# Patient Record
Sex: FEMALE | Race: WHITE | NOT HISPANIC OR LATINO | Employment: PART TIME | URBAN - NONMETROPOLITAN AREA
[De-identification: names, ages, dates, MRNs, and addresses within clinical notes are randomized per-mention and may not be internally consistent; named-entity substitution may affect disease eponyms.]

---

## 2017-01-06 ENCOUNTER — TELEPHONE (OUTPATIENT)
Dept: OTOLARYNGOLOGY | Facility: CLINIC | Age: 57
End: 2017-01-06

## 2017-01-06 NOTE — TELEPHONE ENCOUNTER
----- Message from Lui Miller MD sent at 1/6/2017  8:31 AM CST -----  Please call the patient regarding her normal results, no mass, minimal sinus inflammation

## 2017-03-31 ENCOUNTER — OFFICE VISIT (OUTPATIENT)
Dept: OTOLARYNGOLOGY | Facility: CLINIC | Age: 57
End: 2017-03-31

## 2017-03-31 VITALS
TEMPERATURE: 97.8 F | WEIGHT: 124 LBS | SYSTOLIC BLOOD PRESSURE: 114 MMHG | HEIGHT: 62 IN | HEART RATE: 66 BPM | RESPIRATION RATE: 20 BRPM | BODY MASS INDEX: 22.82 KG/M2 | DIASTOLIC BLOOD PRESSURE: 80 MMHG

## 2017-03-31 DIAGNOSIS — H90.3 SENSORINEURAL HEARING LOSS OF BOTH EARS: ICD-10-CM

## 2017-03-31 DIAGNOSIS — H93.8X3 EAR FULLNESS, BILATERAL: ICD-10-CM

## 2017-03-31 DIAGNOSIS — H81.03 MENIERE'S DISEASE OF BOTH EARS: Primary | ICD-10-CM

## 2017-03-31 DIAGNOSIS — R42 VERTIGO: ICD-10-CM

## 2017-03-31 DIAGNOSIS — J30.9 ALLERGIC RHINITIS, UNSPECIFIED ALLERGIC RHINITIS TRIGGER, UNSPECIFIED RHINITIS SEASONALITY: ICD-10-CM

## 2017-03-31 DIAGNOSIS — H93.13 TINNITUS, BILATERAL: ICD-10-CM

## 2017-03-31 PROCEDURE — 99214 OFFICE O/P EST MOD 30 MIN: CPT | Performed by: NURSE PRACTITIONER

## 2017-03-31 RX ORDER — FLUTICASONE PROPIONATE 50 MCG
2 SPRAY, SUSPENSION (ML) NASAL DAILY
Qty: 1 EACH | Refills: 6 | Status: SHIPPED | OUTPATIENT
Start: 2017-03-31 | End: 2017-04-30

## 2017-03-31 RX ORDER — EPINEPHRINE 0.3 MG/.3ML
0.3 INJECTION SUBCUTANEOUS AS NEEDED
Qty: 1 EACH | Refills: 3 | Status: SHIPPED | OUTPATIENT
Start: 2017-03-31

## 2017-03-31 NOTE — PROGRESS NOTES
PRIMARY CARE PROVIDER: Rupesh Garcia MD  REFERRING PROVIDER: No ref. provider found    Chief Complaint   Patient presents with   • Follow-up     ear problems / hearing loss and dizziness       Subjective   History of Present Illness:  Luna Moscoso is a  56 y.o.  female who is here for follow-up from complaints of ear fullness, vertigo, imbalance, decreased hearing and tinnitus. The symptoms are localized to the bilateral ear. The patient has had variable symptoms. The symptoms have been on and off in the last 1 year. The symptoms are aggravated by  stress.  She has had imbalance problems with intermittent spells of vertigo.  Her vertigo spells typically last hours to a day.  She is currently not having vertigo symptoms.  She states she feels this has improved and is stable. She has not had any episodes of vertigo for the last 2 months and has not been taking Dyazide or Robinul. She feels she is able to control this with a low salt diet. She had an audiogram which showed bilateral asymmetric sensorineural hearing loss.- MRI of the brain was normal, no masses, with minimal sinus inflammation. She does reports continued nasal congestion and nasal drainage and wishes to proceed with immunotherapy.     Review of Systems:  Review of Systems   Constitutional: Positive for fatigue. Negative for fever.   HENT:        See HPI   Eyes: Negative.    Respiratory: Negative.    Cardiovascular: Negative.    Allergic/Immunologic: Negative for food allergies. Environmental allergies: Seasonal.   Neurological: Positive for dizziness. Negative for headaches.       Past History:  Past Medical History:   Diagnosis Date   • Ear fullness    • HL (hearing loss)    • Sinusitis      Past Surgical History:   Procedure Laterality Date   • CARPAL TUNNEL RELEASE     • HAND SURGERY     • HEMORROIDECTOMY     • HYSTERECTOMY     • TONSILLECTOMY     • TRIGGER FINGER RELEASE       Family History   Problem Relation Age of Onset   • No Known Problems  Mother    • No Known Problems Father      Social History   Substance Use Topics   • Smoking status: Former Smoker     Quit date: 9/13/2000   • Smokeless tobacco: None   • Alcohol use No       Current Outpatient Prescriptions:   •  EPINEPHrine (EPIPEN) 0.3 MG/0.3ML solution auto-injector injection, Inject 0.3 mL into the shoulder, thigh, or buttocks As Needed (anaphylaxis) for up to 1 dose., Disp: 1 each, Rfl: 3  •  fluticasone (FLONASE) 50 MCG/ACT nasal spray, 2 sprays into each nostril Daily for 30 days. Administer 2 sprays in each nostril for each dose., Disp: 1 each, Rfl: 6  •  meclizine (ANTIVERT) 25 MG tablet, Take 25 mg by mouth 3 (Three) Times a Day As Needed for dizziness., Disp: , Rfl:   Allergies:  Review of patient's allergies indicates no known allergies.    Objective     Vital Signs:  Temp:  [97.8 °F (36.6 °C)] 97.8 °F (36.6 °C)  Heart Rate:  [66] 66  Resp:  [20] 20  BP: (114)/(80) 114/80    Physical Exam:  CONSTITUTIONAL: well nourished, well-developed, alert, oriented, in no acute distress   COMMUNICATION AND VOICE: able to communicate normally for age, normal voice quality  HEAD: normocephalic, no lesions, atraumatic, no tenderness, no masses   FACE: appearance normal, no lesions, no tenderness, no deformities, facial motion symmetric  SALIVARY GLANDS: parotid glands with no tenderness, no swelling, no masses, submandibular glands with normal size, nontender  EYES: ocular motility normal, eyelids normal, orbits normal, no proptosis, conjunctiva normal , pupils equal, round   EARS:  Hearing: response to conversational voice normal bilaterally   External Ears: auricles without lesions  Otoscopic: tympanic membrane appearance normal, no lesions, no perforation, normal mobility, no fluid  NOSE:  External Nose: structure normal, no tenderness on palpation, no nasal discharge, no lesions, no evidence of trauma, nostrils patent   Intranasal Exam: nasal mucosa inflammation and edema, vestibule within normal  limits, inferior turbinate normal, nasal septum midline   ORAL:  Lips: upper and lower lips without lesion   Teeth: dentition within normal limits for age   Gums: gingivae healthy   Oral Mucosa: oral mucosa normal, no mucosal lesions   Floor of Mouth: Warthin’s duct patent, mucosa normal  Tongue: lingual mucosa normal without lesions, normal tongue mobility   Palate: soft and hard palates with normal mucosa and structure  Oropharynx: oropharyngeal mucosa normal, tonsils normal in appearance   NECK: neck appearance normal, no masses or tenderness  THYROID: no overt thyromegaly, no tenderness, nodules or mass present on palpation, position midline   LYMPH NODES: no lymphadenopathy  CHEST/RESPIRATORY: respiratory effort normal, normal chest excursion   CARDIOVASCULAR: extremities without cyanosis or edema   NEUROLOGIC/PSYCHIATRIC: oriented appropriately, mood normal, affect appropriate, CN II-XII intact grossly      Results Review:   Previous audiogram was reviewed         Assessment   1. Meniere's disease of both ears    2. Sensorineural hearing loss of both ears    3. Tinnitus, bilateral    4. Ear fullness, bilateral    5. Vertigo    6. Allergic rhinitis, unspecified allergic rhinitis trigger, unspecified rhinitis seasonality        Plan   Continue strict low-salt diet and [place] exercises.  For acute flare ups, will consider steroid administration. She would like to proceed with immunotherapy.     INJECTION IMMUNOTHERAPY: Immunotherapy risks and benefits were discussed with the patient/family at length including but not limited to the risk of reaction including local or systemic  reactions and anaphylaxis requiring hospitalization and/or death. The possibility of failure of therapy was also discussed as well as the necessity of having an epi pen with them to receive an injection every time.     -------MEDICATIONS:-------  New Medications Ordered This Visit   Medications   • fluticasone (FLONASE) 50 MCG/ACT nasal  spray     Si sprays into each nostril Daily for 30 days. Administer 2 sprays in each nostril for each dose.     Dispense:  1 each     Refill:  6   • EPINEPHrine (EPIPEN) 0.3 MG/0.3ML solution auto-injector injection     Sig: Inject 0.3 mL into the shoulder, thigh, or buttocks As Needed (anaphylaxis) for up to 1 dose.     Dispense:  1 each     Refill:  3        -----INSTRUCTIONS-----  Follow a low salt diet to try to prevent inner ear fluid accumulation.  Decrease caffeine, avoid red wine, nitrites in cured meats, food additives (like monosodium glutamate/ MSG) and dyes.  Follow the instructions for [place] exersises.  -----FOLLOW UP-----  Return in about 4 months (around 2017), or if symptoms worsen or fail to improve, for Recheck.      Octavia Muller, JOSE RAMON  17  3:40 PM

## 2017-04-07 ENCOUNTER — TELEPHONE (OUTPATIENT)
Dept: OTOLARYNGOLOGY | Facility: CLINIC | Age: 57
End: 2017-04-07

## 2017-04-07 ENCOUNTER — CLINICAL SUPPORT NO REQUIREMENTS (OUTPATIENT)
Dept: OTOLARYNGOLOGY | Facility: CLINIC | Age: 57
End: 2017-04-07

## 2017-04-07 DIAGNOSIS — J30.89 OTHER ALLERGIC RHINITIS: Primary | ICD-10-CM

## 2017-04-07 PROCEDURE — 95165 ANTIGEN THERAPY SERVICES: CPT | Performed by: OTOLARYNGOLOGY

## 2017-04-07 NOTE — TELEPHONE ENCOUNTER
Called patient and and LM to let her know her allergy vial would be at the  clinic on Monday 4/10/17. She needed to call and make an appointment for her injection. She also needed to  her Epi-Pen at the pharmacy that was called in before her injection or it would not be given.

## 2017-06-30 ENCOUNTER — CLINICAL SUPPORT NO REQUIREMENTS (OUTPATIENT)
Dept: OTOLARYNGOLOGY | Facility: CLINIC | Age: 57
End: 2017-06-30

## 2017-06-30 DIAGNOSIS — J30.89 OTHER ALLERGIC RHINITIS: Primary | ICD-10-CM

## 2017-06-30 PROCEDURE — 95165 ANTIGEN THERAPY SERVICES: CPT | Performed by: OTOLARYNGOLOGY

## 2017-07-24 ENCOUNTER — CLINICAL SUPPORT (OUTPATIENT)
Dept: OTOLARYNGOLOGY | Facility: CLINIC | Age: 57
End: 2017-07-24

## 2017-07-24 DIAGNOSIS — J30.89 OTHER ALLERGIC RHINITIS: Primary | ICD-10-CM

## 2017-07-24 PROCEDURE — 95115 IMMUNOTHERAPY ONE INJECTION: CPT | Performed by: NURSE PRACTITIONER

## 2017-07-24 NOTE — PROGRESS NOTES
ALLERGY SHOT PROCEDURE NOTE     ALLERGY TECHNICIAN:   Rick Wasserman    ALLERGY HISTORY OF PRESENT ILLNESS:   The patient is a 57 y.o. she who returns for immunotherapy injection.   The patient overall has had an improvement of symptoms since the last injection. The patient has had a 0 % improvement of symptoms. that lasted 0 days. The patient has had a return of all symptoms since the last injection.     INJECTION DETAILS:   The site of the injection was cleansed with an alcohol swab. Serum was injected into the arm. The patient showed no signs of immediate reaction. After the injection, the patient was instructed to wait in the allergy waiting area for 20 minutes. After waiting, the patient showed no signs of reaction and was allowed to leave.    Left Arm @ .05cc for vial safety testing with an 7mm wheal.

## 2017-08-14 ENCOUNTER — CLINICAL SUPPORT (OUTPATIENT)
Dept: OTOLARYNGOLOGY | Facility: CLINIC | Age: 57
End: 2017-08-14

## 2017-08-14 DIAGNOSIS — J30.9 ALLERGIC RHINITIS, UNSPECIFIED ALLERGIC RHINITIS TRIGGER, UNSPECIFIED RHINITIS SEASONALITY: Primary | ICD-10-CM

## 2017-08-14 PROCEDURE — 95115 IMMUNOTHERAPY ONE INJECTION: CPT | Performed by: OTOLARYNGOLOGY

## 2017-08-28 ENCOUNTER — CLINICAL SUPPORT (OUTPATIENT)
Dept: OTOLARYNGOLOGY | Facility: CLINIC | Age: 57
End: 2017-08-28

## 2017-08-28 DIAGNOSIS — J30.9 ALLERGIC RHINITIS, UNSPECIFIED ALLERGIC RHINITIS TRIGGER, UNSPECIFIED RHINITIS SEASONALITY: Primary | ICD-10-CM

## 2017-08-28 PROCEDURE — 95115 IMMUNOTHERAPY ONE INJECTION: CPT | Performed by: OTOLARYNGOLOGY

## 2017-08-28 NOTE — PROGRESS NOTES
ALLERGY SHOT PROCEDURE NOTE     ALLERGY TECHNICIAN:   Ena Sánchez RN    ALLERGY HISTORY OF PRESENT ILLNESS:   The patient is a 57 y.o., who returns for immunotherapy injection.   The patient overall has had an improvement of symptoms since the last injection. The patient has had a 70% improvement of Ear pressure . That has lasted 8 days. She denies allergy complaints currently.    INJECTION DETAILS:   The site of the injection was cleansed with an alcohol swab. Serum was injected into the site after pulling back on the plunger to prevent intravascular injection. The patient showed no signs of immediate reaction. After the injection and was instructed to wait in the allergy waiting area for 20 minutes. After waiting, the patient showed no signs of reaction and was allowed to leave.    Reaction: 4 mm  Side :     left arm

## 2017-09-11 ENCOUNTER — CLINICAL SUPPORT (OUTPATIENT)
Dept: OTOLARYNGOLOGY | Facility: CLINIC | Age: 57
End: 2017-09-11

## 2017-09-11 DIAGNOSIS — J30.9 ALLERGIC RHINITIS, UNSPECIFIED ALLERGIC RHINITIS TRIGGER, UNSPECIFIED RHINITIS SEASONALITY: Primary | ICD-10-CM

## 2017-09-11 NOTE — PROGRESS NOTES
Patient presented to office for series 1 escalation allergy injection.  I noticed that she has missed a total of two appointments in [place] to continue her weekly escalations.  I discussed this with Dr. Miller, and he feels that the risk of reaction and time to notice results will not be effective for bi-weekly injections.  I had a long discussion with the patient about this and she is disappointed with the decision, but understands the safety reasons behind it.  She is going to discontinue immunotherapy at this time until her schedule changes so that she can come weekly, or such time as we come to [place] every week.